# Patient Record
Sex: FEMALE | Race: BLACK OR AFRICAN AMERICAN | NOT HISPANIC OR LATINO | URBAN - METROPOLITAN AREA
[De-identification: names, ages, dates, MRNs, and addresses within clinical notes are randomized per-mention and may not be internally consistent; named-entity substitution may affect disease eponyms.]

---

## 2020-09-04 ENCOUNTER — OFFICE (OUTPATIENT)
Dept: URBAN - METROPOLITAN AREA CLINIC 19 | Facility: CLINIC | Age: 60
End: 2020-09-04
Payer: MEDICAID

## 2020-09-04 VITALS
SYSTOLIC BLOOD PRESSURE: 137 MMHG | HEIGHT: 64 IN | DIASTOLIC BLOOD PRESSURE: 67 MMHG | OXYGEN SATURATION: 99 % | WEIGHT: 293 LBS | HEART RATE: 58 BPM

## 2020-09-04 DIAGNOSIS — D69.59 OTHER SECONDARY THROMBOCYTOPENIA: ICD-10-CM

## 2020-09-04 DIAGNOSIS — R10.9 UNSPECIFIED ABDOMINAL PAIN: ICD-10-CM

## 2020-09-04 DIAGNOSIS — R11.0 NAUSEA: ICD-10-CM

## 2020-09-04 LAB
AMYLASE: 50 U/L (ref 31–110)
CBC, PLATELET, NO DIFFERENTIAL: HEMATOCRIT: 35.7 % (ref 34–46.6)
CBC, PLATELET, NO DIFFERENTIAL: HEMOGLOBIN: 12.2 G/DL (ref 11.1–15.9)
CBC, PLATELET, NO DIFFERENTIAL: MCH: 32.2 PG (ref 26.6–33)
CBC, PLATELET, NO DIFFERENTIAL: MCHC: 34.2 G/DL (ref 31.5–35.7)
CBC, PLATELET, NO DIFFERENTIAL: MCV: 94 FL (ref 79–97)
CBC, PLATELET, NO DIFFERENTIAL: NRBC: (no result)
CBC, PLATELET, NO DIFFERENTIAL: PLATELETS: 99 X10E3/UL — CRITICAL LOW (ref 150–450)
CBC, PLATELET, NO DIFFERENTIAL: RBC: 3.79 X10E6/UL (ref 3.77–5.28)
CBC, PLATELET, NO DIFFERENTIAL: RDW: 11.8 % (ref 11.7–15.4)
CBC, PLATELET, NO DIFFERENTIAL: WBC: 5.1 X10E3/UL (ref 3.4–10.8)
COMP. METABOLIC PANEL (14): A/G RATIO: 1.7 (ref 1.2–2.2)
COMP. METABOLIC PANEL (14): ALBUMIN: 4.2 G/DL (ref 3.8–4.9)
COMP. METABOLIC PANEL (14): ALKALINE PHOSPHATASE: 170 IU/L — HIGH (ref 39–117)
COMP. METABOLIC PANEL (14): ALT (SGPT): 21 IU/L (ref 0–32)
COMP. METABOLIC PANEL (14): AST (SGOT): 23 IU/L (ref 0–40)
COMP. METABOLIC PANEL (14): BILIRUBIN, TOTAL: 0.7 MG/DL (ref 0–1.2)
COMP. METABOLIC PANEL (14): BUN/CREATININE RATIO: 15 (ref 12–28)
COMP. METABOLIC PANEL (14): BUN: 11 MG/DL (ref 8–27)
COMP. METABOLIC PANEL (14): CALCIUM: 9.5 MG/DL (ref 8.7–10.3)
COMP. METABOLIC PANEL (14): CARBON DIOXIDE, TOTAL: 25 MMOL/L (ref 20–29)
COMP. METABOLIC PANEL (14): CHLORIDE: 106 MMOL/L (ref 96–106)
COMP. METABOLIC PANEL (14): CREATININE: 0.75 MG/DL (ref 0.57–1)
COMP. METABOLIC PANEL (14): EGFR IF AFRICN AM: 100 ML/MIN/1.73 (ref 59–?)
COMP. METABOLIC PANEL (14): EGFR IF NONAFRICN AM: 87 ML/MIN/1.73 (ref 59–?)
COMP. METABOLIC PANEL (14): GLOBULIN, TOTAL: 2.5 G/DL (ref 1.5–4.5)
COMP. METABOLIC PANEL (14): GLUCOSE: 105 MG/DL — HIGH (ref 65–99)
COMP. METABOLIC PANEL (14): POTASSIUM: 4.4 MMOL/L (ref 3.5–5.2)
COMP. METABOLIC PANEL (14): PROTEIN, TOTAL: 6.7 G/DL (ref 6–8.5)
COMP. METABOLIC PANEL (14): SODIUM: 144 MMOL/L (ref 134–144)
HEMATOLOGY COMMENTS: (no result)
LIPASE: 17 U/L (ref 14–72)
SEDIMENTATION RATE-WESTERGREN: 89 MM/HR — HIGH (ref 0–40)

## 2020-09-04 PROCEDURE — 99204 OFFICE O/P NEW MOD 45 MIN: CPT | Performed by: INTERNAL MEDICINE

## 2020-09-04 RX ORDER — DEXLANSOPRAZOLE 60 MG/1
60 CAPSULE, DELAYED RELEASE ORAL
Qty: 30 | Refills: 3 | Status: ACTIVE

## 2020-09-04 RX ORDER — ONDANSETRON HYDROCHLORIDE 4 MG/1
TABLET, FILM COATED ORAL
Qty: 35 | Refills: 2 | Status: ACTIVE

## 2020-09-29 ENCOUNTER — AMBULATORY SURGICAL CENTER (OUTPATIENT)
Dept: URBAN - METROPOLITAN AREA SURGERY 3 | Facility: SURGERY | Age: 60
End: 2020-09-29
Payer: MEDICAID

## 2020-09-29 VITALS
SYSTOLIC BLOOD PRESSURE: 130 MMHG | HEART RATE: 60 BPM | DIASTOLIC BLOOD PRESSURE: 68 MMHG | OXYGEN SATURATION: 97 % | HEART RATE: 60 BPM | HEART RATE: 57 BPM | RESPIRATION RATE: 15 BRPM | TEMPERATURE: 97 F | RESPIRATION RATE: 22 BRPM | OXYGEN SATURATION: 95 % | SYSTOLIC BLOOD PRESSURE: 139 MMHG | HEIGHT: 64 IN | SYSTOLIC BLOOD PRESSURE: 129 MMHG | HEIGHT: 64 IN | TEMPERATURE: 97.3 F | SYSTOLIC BLOOD PRESSURE: 138 MMHG | HEART RATE: 59 BPM | DIASTOLIC BLOOD PRESSURE: 76 MMHG | DIASTOLIC BLOOD PRESSURE: 78 MMHG | SYSTOLIC BLOOD PRESSURE: 130 MMHG | HEART RATE: 57 BPM | DIASTOLIC BLOOD PRESSURE: 77 MMHG | HEART RATE: 64 BPM | HEART RATE: 57 BPM | RESPIRATION RATE: 22 BRPM | TEMPERATURE: 97.3 F | HEART RATE: 59 BPM | SYSTOLIC BLOOD PRESSURE: 139 MMHG | OXYGEN SATURATION: 95 % | SYSTOLIC BLOOD PRESSURE: 130 MMHG | TEMPERATURE: 97 F | HEART RATE: 64 BPM | OXYGEN SATURATION: 96 % | RESPIRATION RATE: 23 BRPM | OXYGEN SATURATION: 95 % | RESPIRATION RATE: 16 BRPM | SYSTOLIC BLOOD PRESSURE: 138 MMHG | OXYGEN SATURATION: 97 % | TEMPERATURE: 97.3 F | RESPIRATION RATE: 15 BRPM | SYSTOLIC BLOOD PRESSURE: 128 MMHG | RESPIRATION RATE: 16 BRPM | RESPIRATION RATE: 15 BRPM | HEART RATE: 59 BPM | DIASTOLIC BLOOD PRESSURE: 77 MMHG | RESPIRATION RATE: 16 BRPM | SYSTOLIC BLOOD PRESSURE: 129 MMHG | RESPIRATION RATE: 23 BRPM | HEART RATE: 64 BPM | DIASTOLIC BLOOD PRESSURE: 78 MMHG | SYSTOLIC BLOOD PRESSURE: 128 MMHG | DIASTOLIC BLOOD PRESSURE: 68 MMHG | HEIGHT: 64 IN | DIASTOLIC BLOOD PRESSURE: 71 MMHG | HEART RATE: 60 BPM | SYSTOLIC BLOOD PRESSURE: 139 MMHG | DIASTOLIC BLOOD PRESSURE: 68 MMHG | SYSTOLIC BLOOD PRESSURE: 128 MMHG | DIASTOLIC BLOOD PRESSURE: 78 MMHG | OXYGEN SATURATION: 96 % | DIASTOLIC BLOOD PRESSURE: 71 MMHG | DIASTOLIC BLOOD PRESSURE: 77 MMHG | RESPIRATION RATE: 23 BRPM | DIASTOLIC BLOOD PRESSURE: 76 MMHG | DIASTOLIC BLOOD PRESSURE: 71 MMHG | DIASTOLIC BLOOD PRESSURE: 76 MMHG | SYSTOLIC BLOOD PRESSURE: 129 MMHG | OXYGEN SATURATION: 96 % | TEMPERATURE: 97 F | OXYGEN SATURATION: 97 % | SYSTOLIC BLOOD PRESSURE: 138 MMHG | RESPIRATION RATE: 22 BRPM

## 2020-09-29 DIAGNOSIS — Z98.84 BARIATRIC SURGERY STATUS: ICD-10-CM

## 2020-09-29 DIAGNOSIS — R11.0 NAUSEA: ICD-10-CM

## 2020-09-29 PROBLEM — Z48.815 ENCNTR FOR SURGICAL AFTCR FOLLOWING SURGERY ON THE DGSTV SYS: Status: ACTIVE | Noted: 2020-09-29

## 2020-09-29 PROCEDURE — 43235 EGD DIAGNOSTIC BRUSH WASH: CPT | Performed by: INTERNAL MEDICINE

## 2020-09-29 PROCEDURE — G8907 PT DOC NO EVENTS ON DISCHARG: HCPCS | Performed by: INTERNAL MEDICINE

## 2020-09-29 PROCEDURE — G8918 PT W/O PREOP ORDER IV AB PRO: HCPCS | Performed by: INTERNAL MEDICINE

## 2022-07-07 ENCOUNTER — OFFICE (OUTPATIENT)
Dept: URBAN - METROPOLITAN AREA CLINIC 19 | Facility: CLINIC | Age: 62
End: 2022-07-07
Payer: MEDICAID

## 2022-07-07 VITALS
DIASTOLIC BLOOD PRESSURE: 57 MMHG | WEIGHT: 293 LBS | OXYGEN SATURATION: 100 % | HEART RATE: 65 BPM | HEIGHT: 64 IN | SYSTOLIC BLOOD PRESSURE: 143 MMHG

## 2022-07-07 DIAGNOSIS — K59.00 CONSTIPATION, UNSPECIFIED: ICD-10-CM

## 2022-07-07 DIAGNOSIS — K21.9 GASTRO-ESOPHAGEAL REFLUX DISEASE WITHOUT ESOPHAGITIS: ICD-10-CM

## 2022-07-07 DIAGNOSIS — R74.8 ABNORMAL LEVELS OF OTHER SERUM ENZYMES: ICD-10-CM

## 2022-07-07 LAB
ALK PHOS ISOENZYME: ALKALINE PHOSPHATASE: 192 IU/L — HIGH (ref 44–121)
ALK PHOS ISOENZYME: BONE FRACTION: 45 % (ref 14–68)
ALK PHOS ISOENZYME: INTESTINAL FRAC.: 1 % (ref 0–18)
ALK PHOS ISOENZYME: LIVER FRACTION: 54 % (ref 18–85)

## 2022-07-07 PROCEDURE — 99214 OFFICE O/P EST MOD 30 MIN: CPT

## 2022-07-07 NOTE — SERVICEHPINOTES
62-year-old obese single black female returns as requested by her Rheumatologist, Pasquale Pandey MD for elevated alkaline phosphatase.
abhilash hung 
  She was last here for EGD 9/29/20 to evaluate complaints of vague abdominal pain and nausea.  EGD was normal.  Routine lab work 9/4/20 with Dr. Mujica was remarkable for elevated alkaline phosphatase of 170, ESR of 89 and low platelet count of 99.
abhilash Connell has been seen by hematologist, Mark Muniz MD for her low platelet count.  Blood work 11/10/21 was remarkable for mildly elevated ALT of 38, AST 50, alkaline phosphatase 176, elevated ESR of 43 and positive INDIA 1:160 (speckled).  She was referred to rheumatologist, Pasquale Pandey MD where apparently workup for rheumatoid arthritis and other autoimmune conditions was unremarkable.  She apparently had abnormal liver function tests (? ) and was told to follow-up with GI regarding these (records requested).  She does continue to have chronic vague abdominal pain.  She did have a CT abdomen/pelvis 9/16/21 that was unremarkable.  She does take Dexilant daily for chronic reflux.  She has Zofran for occasional nausea.  She does have chronic constipation, but denies taking any laxatives except for occasional stool softener.   She denies dysphagia or overt GI bleeding.She has seen José Luis Griffith MD in the past. CT abdomen/pelvis 09/25/2012, 10/9/12, MR I abdomen 3/28/12 and 4/2/13 and EGD 9/19/12 were unremarkable except for a 2.5 cm liver hemangioma and H pylori negative gastritis. Raad Contreras MD Performed EGD/colonoscopy 9/23/14 which found H pylori negative gastritis (colonoscopy normal). Family history is negative for colon neoplasm.

## 2022-09-06 ENCOUNTER — OFFICE (OUTPATIENT)
Dept: URBAN - METROPOLITAN AREA PATHOLOGY 22 | Facility: PATHOLOGY | Age: 62
End: 2022-09-06
Payer: MEDICAID

## 2022-09-06 DIAGNOSIS — K74.3 PRIMARY BILIARY CIRRHOSIS: ICD-10-CM

## 2022-09-30 ENCOUNTER — OFFICE (OUTPATIENT)
Dept: URBAN - METROPOLITAN AREA CLINIC 19 | Facility: CLINIC | Age: 62
End: 2022-09-30
Payer: MEDICAID

## 2022-09-30 VITALS
SYSTOLIC BLOOD PRESSURE: 114 MMHG | WEIGHT: 293 LBS | HEART RATE: 64 BPM | HEIGHT: 64 IN | DIASTOLIC BLOOD PRESSURE: 47 MMHG | OXYGEN SATURATION: 100 %

## 2022-09-30 DIAGNOSIS — K74.3 PRIMARY BILIARY CIRRHOSIS: ICD-10-CM

## 2022-09-30 DIAGNOSIS — R10.9 UNSPECIFIED ABDOMINAL PAIN: ICD-10-CM

## 2022-09-30 DIAGNOSIS — K59.00 CONSTIPATION, UNSPECIFIED: ICD-10-CM

## 2022-09-30 DIAGNOSIS — K21.9 GASTRO-ESOPHAGEAL REFLUX DISEASE WITHOUT ESOPHAGITIS: ICD-10-CM

## 2022-09-30 LAB
AFP, SERUM, TUMOR MARKER: 6.8 NG/ML (ref 0–9.2)
CBC, PLATELET, NO DIFFERENTIAL: HEMATOCRIT: 38.3 % (ref 34–46.6)
CBC, PLATELET, NO DIFFERENTIAL: HEMOGLOBIN: 12.7 G/DL (ref 11.1–15.9)
CBC, PLATELET, NO DIFFERENTIAL: MCH: 31.5 PG (ref 26.6–33)
CBC, PLATELET, NO DIFFERENTIAL: MCHC: 33.2 G/DL (ref 31.5–35.7)
CBC, PLATELET, NO DIFFERENTIAL: MCV: 95 FL (ref 79–97)
CBC, PLATELET, NO DIFFERENTIAL: PLATELETS: 97 X10E3/UL — CRITICAL LOW (ref 150–450)
CBC, PLATELET, NO DIFFERENTIAL: RBC: 4.03 X10E6/UL (ref 3.77–5.28)
CBC, PLATELET, NO DIFFERENTIAL: RDW: 12.3 % (ref 11.7–15.4)
CBC, PLATELET, NO DIFFERENTIAL: WBC: 5 X10E3/UL (ref 3.4–10.8)
COMP. METABOLIC PANEL (14): A/G RATIO: 2 (ref 1.2–2.2)
COMP. METABOLIC PANEL (14): ALBUMIN: 4.3 G/DL (ref 3.8–4.8)
COMP. METABOLIC PANEL (14): ALKALINE PHOSPHATASE: 192 IU/L — HIGH (ref 44–121)
COMP. METABOLIC PANEL (14): ALT (SGPT): 17 IU/L (ref 0–32)
COMP. METABOLIC PANEL (14): AST (SGOT): 27 IU/L (ref 0–40)
COMP. METABOLIC PANEL (14): BILIRUBIN, TOTAL: 0.9 MG/DL (ref 0–1.2)
COMP. METABOLIC PANEL (14): BUN/CREATININE RATIO: 18 (ref 12–28)
COMP. METABOLIC PANEL (14): BUN: 12 MG/DL (ref 8–27)
COMP. METABOLIC PANEL (14): CALCIUM: 9 MG/DL (ref 8.7–10.3)
COMP. METABOLIC PANEL (14): CARBON DIOXIDE, TOTAL: 26 MMOL/L (ref 20–29)
COMP. METABOLIC PANEL (14): CHLORIDE: 106 MMOL/L (ref 96–106)
COMP. METABOLIC PANEL (14): CREATININE: 0.66 MG/DL (ref 0.57–1)
COMP. METABOLIC PANEL (14): EGFR: 99 ML/MIN/1.73 (ref 59–?)
COMP. METABOLIC PANEL (14): GLOBULIN, TOTAL: 2.1 G/DL (ref 1.5–4.5)
COMP. METABOLIC PANEL (14): GLUCOSE: 88 MG/DL (ref 70–99)
COMP. METABOLIC PANEL (14): POTASSIUM: 4.2 MMOL/L (ref 3.5–5.2)
COMP. METABOLIC PANEL (14): PROTEIN, TOTAL: 6.4 G/DL (ref 6–8.5)
COMP. METABOLIC PANEL (14): SODIUM: 144 MMOL/L (ref 134–144)
HEMATOLOGY COMMENTS: (no result)
PROTHROMBIN TIME (PT): INR: 1 (ref 0.9–1.2)
PROTHROMBIN TIME (PT): PROTHROMBIN TIME: 10.5 SEC (ref 9.1–12)

## 2022-09-30 PROCEDURE — 99214 OFFICE O/P EST MOD 30 MIN: CPT

## 2022-09-30 RX ORDER — URSODIOL 500 MG/1
2000 TABLET ORAL
Qty: 120 | Refills: 5 | Status: ACTIVE
Start: 2022-09-30

## 2022-09-30 NOTE — SERVICENOTES
The patient's assessment was reviewed with Dr. Mujica and a collaborative plan of care was established.

## 2022-09-30 NOTE — SERVICEHPINOTES
62-year-old obese single black female returns to discuss her liver biopsy results.
abhilash Kaye saw her last all 7/7/22 as requested by her Rheumatologist, Pasquale Pandey MD for elevated alkaline phosphatase. She had been seen by hematologist, Mark Muniz MD for her low platelet count.  Blood work 11/10/21 was remarkable for mildly elevated ALT 38, AST 50, alkaline phosphatase 176, elevated ESR of 43 and positive INDIA 1:160 (speckled).  She was referred to rheumatologist, Pasquale Pandey MD where apparently workup for rheumatoid arthritis and other autoimmune conditions was unremarkable.  She apparently had abnormal liver function tests (? 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 She does continue to have chronic vague abdominal pain.  She did have a CT abdomen/pelvis 9/16/21 that was unremarkable.   I ordered alkaline phosphatase isoenzymes 7/7/22 which found elevated ALP of 192, but was otherwise unremarkable.  She had a liver biopsy 9/6/22 which confirmed primary biliary cholangitis stage I.
abhilash Connell does take Dexilant daily for chronic reflux.  She has Zofran for occasional nausea.  She does have chronic constipation, but denies taking any laxatives except for occasional stool softener.   She denies dysphagia or overt GI bleeding.  She was here for EGD 9/29/20 to evaluate complaints of vague abdominal pain and nausea.  EGD was normal.  Routine lab work 9/4/20 with Dr. Mujica was remarkable for elevated alkaline phosphatase of 170, ESR of 89 and low platelet count of 99.She has seen José Luis Griffith MD in the past. CT abdomen/pelvis 09/25/2012, 10/9/12, MR I abdomen 3/28/12 and 4/2/13 and EGD 9/19/12 were unremarkable except for a 2.5 cm liver hemangioma and H pylori negative gastritis. Raad Contreras MD Performed EGD/colonoscopy 9/23/14 which found H pylori negative gastritis (colonoscopy normal). Family history is negative for colon neoplasm.

## 2023-03-30 ENCOUNTER — OFFICE (OUTPATIENT)
Dept: URBAN - METROPOLITAN AREA CLINIC 19 | Facility: CLINIC | Age: 63
End: 2023-03-30
Payer: MEDICAID

## 2023-03-30 VITALS
WEIGHT: 293 LBS | HEART RATE: 74 BPM | HEIGHT: 63 IN | SYSTOLIC BLOOD PRESSURE: 133 MMHG | OXYGEN SATURATION: 96 % | DIASTOLIC BLOOD PRESSURE: 60 MMHG

## 2023-03-30 DIAGNOSIS — R53.83 OTHER FATIGUE: ICD-10-CM

## 2023-03-30 DIAGNOSIS — K59.00 CONSTIPATION, UNSPECIFIED: ICD-10-CM

## 2023-03-30 DIAGNOSIS — K21.9 GASTRO-ESOPHAGEAL REFLUX DISEASE WITHOUT ESOPHAGITIS: ICD-10-CM

## 2023-03-30 DIAGNOSIS — K74.3 PRIMARY BILIARY CIRRHOSIS: ICD-10-CM

## 2023-03-30 PROCEDURE — 99214 OFFICE O/P EST MOD 30 MIN: CPT

## 2023-03-30 NOTE — SERVICEHPINOTES
62-year-old obese single black female returns for routine follow up of her PBC.  She denies any significant interval history since I saw her 9/30/22 discuss her liver biopsy results and initiate treatment for her primary biliary cholangitis.  She does have fatigue that has been persistent, but might be related to cardiac as she is being seen by a cardiologist for this.  She does have chronic reflux which seems to be relatively well managed on omeprazole 40 mg daily. She was previously on Dexilant, but this was switched to omeprazole due to insurance.  She also has chronic constipation and takes a daily stool softener, but often only has a bowel movement once or twice a week.  She denies nausea, vomiting, dysphagia, abdominal pain or overt GI bleeding.
br
br   Routine lab work 9/3/22 was remarkable for OGD=511.I saw her 7/7/22 as requested by her Rheumatologist, Pasquale Pandey MD for elevated alkaline phosphatase. She had been seen by hematologist, Mark Muniz MD for her low platelet count.  Blood work 11/10/21 was remarkable for mildly elevated ALT 38, AST 50, alkaline phosphatase 176, elevated ESR of 43 and positive INDIA 1:160 (speckled).  She was referred to rheumatologist, Pasquale Pandey MD where apparently workup for rheumatoid arthritis and other autoimmune conditions was unremarkable.  She apparently had abnormal liver function tests (? 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 She does continue to have chronic vague abdominal pain.  She did have a CT abdomen/pelvis 9/16/21 that was unremarkable.   I ordered alkaline phosphatase isoenzymes 7/7/22 which found elevated ALP of 192, but was otherwise unremarkable.  She had a liver biopsy 9/6/22 which confirmed primary biliary cholangitis stage I.  She was here for EGD 9/29/20 to evaluate complaints of vague abdominal pain and nausea.  EGD was normal.  Routine lab work 9/4/20 with Dr. Mujica was remarkable for elevated alkaline phosphatase of 170, ESR of 89 and low platelet count of 99.She has seen José Luis Griffith MD in the past. CT abdomen/pelvis 09/25/2012, 10/9/12, MR I abdomen 3/28/12 and 4/2/13 and EGD 9/19/12 were unremarkable except for a 2.5 cm liver hemangioma and H pylori negative gastritis. Raad Contreras MD Performed EGD/colonoscopy 9/23/14 which found H pylori negative gastritis (colonoscopy normal). Family history is negative for colon neoplasm.

## 2023-03-31 LAB
AFP, SERUM, TUMOR MARKER: 8.4 NG/ML (ref 0–9.2)
CBC, PLATELET, NO DIFFERENTIAL: HEMATOCRIT: 36.1 % (ref 34–46.6)
CBC, PLATELET, NO DIFFERENTIAL: HEMATOLOGY COMMENTS: (no result)
CBC, PLATELET, NO DIFFERENTIAL: HEMOGLOBIN: 12 G/DL (ref 11.1–15.9)
CBC, PLATELET, NO DIFFERENTIAL: MCH: 31 PG (ref 26.6–33)
CBC, PLATELET, NO DIFFERENTIAL: MCHC: 33.2 G/DL (ref 31.5–35.7)
CBC, PLATELET, NO DIFFERENTIAL: MCV: 93 FL (ref 79–97)
CBC, PLATELET, NO DIFFERENTIAL: NRBC: (no result)
CBC, PLATELET, NO DIFFERENTIAL: PLATELETS: 99 X10E3/UL — CRITICAL LOW (ref 150–450)
CBC, PLATELET, NO DIFFERENTIAL: RBC: 3.87 X10E6/UL (ref 3.77–5.28)
CBC, PLATELET, NO DIFFERENTIAL: RDW: 12 % (ref 11.7–15.4)
CBC, PLATELET, NO DIFFERENTIAL: WBC: 4.4 X10E3/UL (ref 3.4–10.8)
COMP. METABOLIC PANEL (14): A/G RATIO: 1.6 (ref 1.2–2.2)
COMP. METABOLIC PANEL (14): ALBUMIN: 4 G/DL (ref 3.8–4.8)
COMP. METABOLIC PANEL (14): ALKALINE PHOSPHATASE: 206 IU/L — HIGH (ref 44–121)
COMP. METABOLIC PANEL (14): ALT (SGPT): 9 IU/L (ref 0–32)
COMP. METABOLIC PANEL (14): AST (SGOT): 17 IU/L (ref 0–40)
COMP. METABOLIC PANEL (14): BILIRUBIN, TOTAL: 0.7 MG/DL (ref 0–1.2)
COMP. METABOLIC PANEL (14): BUN/CREATININE RATIO: 22 (ref 12–28)
COMP. METABOLIC PANEL (14): BUN: 16 MG/DL (ref 8–27)
COMP. METABOLIC PANEL (14): CALCIUM: 9.1 MG/DL (ref 8.7–10.3)
COMP. METABOLIC PANEL (14): CARBON DIOXIDE, TOTAL: 23 MMOL/L (ref 20–29)
COMP. METABOLIC PANEL (14): CHLORIDE: 107 MMOL/L — HIGH (ref 96–106)
COMP. METABOLIC PANEL (14): CREATININE: 0.73 MG/DL (ref 0.57–1)
COMP. METABOLIC PANEL (14): EGFR: 93 ML/MIN/1.73 (ref 59–?)
COMP. METABOLIC PANEL (14): GLOBULIN, TOTAL: 2.5 G/DL (ref 1.5–4.5)
COMP. METABOLIC PANEL (14): GLUCOSE: 100 MG/DL — HIGH (ref 70–99)
COMP. METABOLIC PANEL (14): POTASSIUM: 4.4 MMOL/L (ref 3.5–5.2)
COMP. METABOLIC PANEL (14): PROTEIN, TOTAL: 6.5 G/DL (ref 6–8.5)
COMP. METABOLIC PANEL (14): SODIUM: 144 MMOL/L (ref 134–144)
PROTHROMBIN TIME (PT): INR: 1 (ref 0.9–1.2)
PROTHROMBIN TIME (PT): PROTHROMBIN TIME: 10.4 SEC (ref 9.1–12)

## 2023-04-25 ENCOUNTER — OFFICE (OUTPATIENT)
Dept: URBAN - METROPOLITAN AREA CLINIC 19 | Facility: CLINIC | Age: 63
End: 2023-04-25
Payer: MEDICAID

## 2023-04-25 DIAGNOSIS — K74.3 PRIMARY BILIARY CIRRHOSIS: ICD-10-CM

## 2023-04-25 PROCEDURE — 76700 US EXAM ABDOM COMPLETE: CPT | Mod: TC

## 2023-06-07 ENCOUNTER — OFFICE (OUTPATIENT)
Dept: URBAN - METROPOLITAN AREA CLINIC 19 | Facility: CLINIC | Age: 63
End: 2023-06-07
Payer: MEDICAID

## 2023-06-07 VITALS
HEART RATE: 53 BPM | HEIGHT: 63 IN | DIASTOLIC BLOOD PRESSURE: 51 MMHG | WEIGHT: 293 LBS | SYSTOLIC BLOOD PRESSURE: 134 MMHG | OXYGEN SATURATION: 99 %

## 2023-06-07 DIAGNOSIS — K21.9 GASTRO-ESOPHAGEAL REFLUX DISEASE WITHOUT ESOPHAGITIS: ICD-10-CM

## 2023-06-07 DIAGNOSIS — K74.3 PRIMARY BILIARY CIRRHOSIS: ICD-10-CM

## 2023-06-07 DIAGNOSIS — K59.00 CONSTIPATION, UNSPECIFIED: ICD-10-CM

## 2023-06-07 DIAGNOSIS — R53.83 OTHER FATIGUE: ICD-10-CM

## 2023-06-07 PROCEDURE — 99214 OFFICE O/P EST MOD 30 MIN: CPT

## 2023-06-07 NOTE — SERVICEHPINOTES
62-year-old obese single black female returns for follow up of her PBC. I last saw her 3/30/23 where routine lab work found EXU=353.  ALP was 192 prior to this on 9/3/22.  I wanted to start her on 5 mg of Ocaliva, but of had great difficulty and getting coverage for this.  Finally this week we received approval from her insurance of coverage.  She has not received the medication yet, but should by the end of the week.  She has been seeing her PCP, Allan Pang DO for fluid overload and peripheral edema.  He has her on Lasix 20 mg daily and apparently sent in another medication that was contraindicated by the pharmacy.  This was just sent on Friday of last week and she has not been able to contact her PCP about this.  She also follows with hematologist, Mark Muniz MD for thrombocytopenia.  He believes this is a result of her primary biliary cholangitis.  He also is wondering if the fluid overload is due to her underlying PBC.  She had an AUS 4/25/23 which found mild hepatosplenomegaly consistent with primary biliary cholangitis and chronic liver disease.  There is no ascites seen.  Her weight is somewhat stable since I saw her last 3/30/23 as she was 3 of 5 lb at that time and is 309 lb today.  She has chronic constipation and has been advised to take MiraLax regularly for this.  She denies any overt GI bleeding.  She is still on Ursodiol 1 g BID.I saw her 7/7/22 as requested by her Rheumatologist, Pasquale Pandey MD for elevated alkaline phosphatase. She had been seen by hematologist, Mark Muniz MD for her low platelet count.  Blood work 11/10/21 was remarkable for mildly elevated ALT 38, AST 50, alkaline phosphatase 176, elevated ESR of 43 and positive INDIA 1:160 (speckled).  She was referred to rheumatologist, Pasquale Pandey MD where apparently workup for rheumatoid arthritis and other autoimmune conditions was unremarkable.  She apparently had abnormal liver function tests (? 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 She does continue to have chronic vague abdominal pain.  She did have a CT abdomen/pelvis 9/16/21 that was unremarkable.   I ordered alkaline phosphatase isoenzymes 7/7/22 which found elevated ALP of 192, but was otherwise unremarkable.  She had a liver biopsy 9/6/22 which confirmed primary biliary cholangitis stage I.  She was here for EGD 9/29/20 to evaluate complaints of vague abdominal pain and nausea.  EGD was normal.  Routine lab work 9/4/20 with Dr. Mujica was remarkable for elevated alkaline phosphatase of 170, ESR of 89 and low platelet count of 99.She has seen José Luis Griffith MD in the past. CT abdomen/pelvis 09/25/2012, 10/9/12, MR I abdomen 3/28/12 and 4/2/13 and EGD 9/19/12 were unremarkable except for a 2.5 cm liver hemangioma and H pylori negative gastritis. Raad Contreras MD Performed EGD/colonoscopy 9/23/14 which found H pylori negative gastritis (colonoscopy normal). Family history is negative for colon neoplasm.

## 2023-06-08 LAB
CBC, PLATELET, NO DIFFERENTIAL: HEMATOCRIT: 35.4 % (ref 34–46.6)
CBC, PLATELET, NO DIFFERENTIAL: HEMATOLOGY COMMENTS: (no result)
CBC, PLATELET, NO DIFFERENTIAL: HEMOGLOBIN: 11.6 G/DL (ref 11.1–15.9)
CBC, PLATELET, NO DIFFERENTIAL: MCH: 31.1 PG (ref 26.6–33)
CBC, PLATELET, NO DIFFERENTIAL: MCHC: 32.8 G/DL (ref 31.5–35.7)
CBC, PLATELET, NO DIFFERENTIAL: MCV: 95 FL (ref 79–97)
CBC, PLATELET, NO DIFFERENTIAL: NRBC: (no result)
CBC, PLATELET, NO DIFFERENTIAL: PLATELETS: 92 X10E3/UL — CRITICAL LOW (ref 150–450)
CBC, PLATELET, NO DIFFERENTIAL: RBC: 3.73 X10E6/UL — LOW (ref 3.77–5.28)
CBC, PLATELET, NO DIFFERENTIAL: RDW: 12 % (ref 11.7–15.4)
CBC, PLATELET, NO DIFFERENTIAL: WBC: 5.3 X10E3/UL (ref 3.4–10.8)
COMP. METABOLIC PANEL (14): A/G RATIO: 1.5 (ref 1.2–2.2)
COMP. METABOLIC PANEL (14): ALBUMIN: 4 G/DL (ref 3.8–4.8)
COMP. METABOLIC PANEL (14): ALKALINE PHOSPHATASE: 219 IU/L — HIGH (ref 44–121)
COMP. METABOLIC PANEL (14): ALT (SGPT): 10 IU/L (ref 0–32)
COMP. METABOLIC PANEL (14): AST (SGOT): 19 IU/L (ref 0–40)
COMP. METABOLIC PANEL (14): BILIRUBIN, TOTAL: 0.8 MG/DL (ref 0–1.2)
COMP. METABOLIC PANEL (14): BUN/CREATININE RATIO: 21 (ref 12–28)
COMP. METABOLIC PANEL (14): BUN: 15 MG/DL (ref 8–27)
COMP. METABOLIC PANEL (14): CALCIUM: 9.2 MG/DL (ref 8.7–10.3)
COMP. METABOLIC PANEL (14): CARBON DIOXIDE, TOTAL: 26 MMOL/L (ref 20–29)
COMP. METABOLIC PANEL (14): CHLORIDE: 103 MMOL/L (ref 96–106)
COMP. METABOLIC PANEL (14): CREATININE: 0.73 MG/DL (ref 0.57–1)
COMP. METABOLIC PANEL (14): EGFR: 93 ML/MIN/1.73 (ref 59–?)
COMP. METABOLIC PANEL (14): GLOBULIN, TOTAL: 2.7 G/DL (ref 1.5–4.5)
COMP. METABOLIC PANEL (14): GLUCOSE: 84 MG/DL (ref 70–99)
COMP. METABOLIC PANEL (14): POTASSIUM: 4.6 MMOL/L (ref 3.5–5.2)
COMP. METABOLIC PANEL (14): PROTEIN, TOTAL: 6.7 G/DL (ref 6–8.5)
COMP. METABOLIC PANEL (14): SODIUM: 144 MMOL/L (ref 134–144)
PROTHROMBIN TIME (PT): INR: 1 (ref 0.9–1.2)
PROTHROMBIN TIME (PT): PROTHROMBIN TIME: 10.5 SEC (ref 9.1–12)

## 2023-09-18 ENCOUNTER — OFFICE (OUTPATIENT)
Dept: URBAN - METROPOLITAN AREA CLINIC 19 | Facility: CLINIC | Age: 63
End: 2023-09-18
Payer: MEDICAID

## 2023-09-18 VITALS
WEIGHT: 293 LBS | SYSTOLIC BLOOD PRESSURE: 155 MMHG | DIASTOLIC BLOOD PRESSURE: 49 MMHG | HEIGHT: 63 IN | HEART RATE: 58 BPM | OXYGEN SATURATION: 98 %

## 2023-09-18 DIAGNOSIS — K74.3 PRIMARY BILIARY CIRRHOSIS: ICD-10-CM

## 2023-09-18 DIAGNOSIS — K21.9 GASTRO-ESOPHAGEAL REFLUX DISEASE WITHOUT ESOPHAGITIS: ICD-10-CM

## 2023-09-18 DIAGNOSIS — K59.00 CONSTIPATION, UNSPECIFIED: ICD-10-CM

## 2023-09-18 DIAGNOSIS — R53.83 OTHER FATIGUE: ICD-10-CM

## 2023-09-18 PROCEDURE — 99214 OFFICE O/P EST MOD 30 MIN: CPT

## 2023-09-18 NOTE — SERVICEHPINOTES
62-year-old obese single black female returns for follow up of her PBC.
br
abhilash She has now been on the Ocaliva 5 mg/d for the last 3 months.  I saw her 3/30/23 where routine lab work found KJK=[id number].  ALP was 192 prior to this 9/3/22.  I wanted to start her on 5 mg of Ocaliva at that, but had great difficulty in getting coverage for this.  Finally we received approval from her insurance of coverage the day I saw her most recently in clinic, 6/7/23.  She had not received the medication at that point, but was expecting it at the end of that week.  She had been seeing her PCP, Allan Pang DO for fluid overload and peripheral edema.  He had her on Lasix 20 mg/d and apparently sent in another medication that was contraindicated by the pharmacy.  She follows with hematologist, Mark Muniz MD for thrombocytopenia.  He believes this is a result of her PBC.  He also is wondering if the fluid overload is due to her underlying PBC.  She had an AUS 4/25/23 which found mild hepatosplenomegaly consistent with primary biliary cholangitis and chronic liver disease.  There was no ascites seen.  
br
abhilash   She returns again today in follow-up after being on the Ocaliva 5 mg/d.  She apparently went to the ER the end of last month for dehydration and had significant kidney dysfunction.  She followed up shortly with her nephrologist, Aden Ortega MD, and was taken off quite a few her medications.  She cannot identify which ones were discontinued, but did not think any of the more GI related.  She is doing better now and follows up with him in a few weeks to re-evaluate her kidney function.  She has chronic constipation and is taking MiraLax every other day for this.  She is also on omeprazole 40 milligrams daily for chronic GERD.  She still takes the Ursodiol 1 g BID in addition to the Ocaliva 5 mg/d.I saw her 7/7/22 as requested by her Rheumatologist, Pasquale Pandey MD for elevated alkaline phosphatase. She had been seen by hematologist, Mark Muniz MD for her low platelet count.  Blood work 11/10/21 was remarkable for mildly elevated ALT 38, AST 50, alkaline phosphatase 176, elevated ESR of 43 and positive INDIA 1:160 (speckled).  She was referred to rheumatologist, Pasquale Pandey MD where apparently workup for rheumatoid arthritis and other autoimmune conditions was unremarkable.  She apparently had abnormal liver function tests (? 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 She does continue to have chronic vague abdominal pain.  She did have a CT abdomen/pelvis 9/16/21 that was unremarkable.   I ordered alkaline phosphatase isoenzymes 7/7/22 which found elevated ALP of 192, but was otherwise unremarkable.  She had a liver biopsy 9/6/22 which confirmed primary biliary cholangitis stage I.  She was here for EGD 9/29/20 to evaluate complaints of vague abdominal pain and nausea.  EGD was normal.  Routine lab work 9/4/20 with Dr. Mujica was remarkable for elevated alkaline phosphatase of 170, ESR of 89 and low platelet count of 99.She has seen José Luis Griffith MD in the past. CT abdomen/pelvis 09/25/2012, 10/9/12, MR I abdomen 3/28/12 and 4/2/13 and EGD 9/19/12 were unremarkable except for a 2.5 cm liver hemangioma and H pylori negative gastritis. Raad Contreras MD Performed EGD/colonoscopy 9/23/14 which found H pylori negative gastritis (colonoscopy normal). Family history is negative for colon neoplasm.

## 2023-09-19 LAB
AFP, SERUM, TUMOR MARKER: 9.8 NG/ML — HIGH (ref 0–9.2)
CBC, PLATELET, NO DIFFERENTIAL: HEMATOCRIT: 35.8 % (ref 34–46.6)
CBC, PLATELET, NO DIFFERENTIAL: HEMATOLOGY COMMENTS: (no result)
CBC, PLATELET, NO DIFFERENTIAL: HEMOGLOBIN: 11.7 G/DL (ref 11.1–15.9)
CBC, PLATELET, NO DIFFERENTIAL: MCH: 31 PG (ref 26.6–33)
CBC, PLATELET, NO DIFFERENTIAL: MCHC: 32.7 G/DL (ref 31.5–35.7)
CBC, PLATELET, NO DIFFERENTIAL: MCV: 95 FL (ref 79–97)
CBC, PLATELET, NO DIFFERENTIAL: PLATELETS: 92 X10E3/UL — CRITICAL LOW (ref 150–450)
CBC, PLATELET, NO DIFFERENTIAL: RBC: 3.77 X10E6/UL (ref 3.77–5.28)
CBC, PLATELET, NO DIFFERENTIAL: RDW: 12.1 % (ref 11.7–15.4)
CBC, PLATELET, NO DIFFERENTIAL: WBC: 3.9 X10E3/UL (ref 3.4–10.8)
COMP. METABOLIC PANEL (14): A/G RATIO: 1.7 (ref 1.2–2.2)
COMP. METABOLIC PANEL (14): ALBUMIN: 4.2 G/DL (ref 3.9–4.9)
COMP. METABOLIC PANEL (14): ALKALINE PHOSPHATASE: 183 IU/L — HIGH (ref 44–121)
COMP. METABOLIC PANEL (14): ALT (SGPT): 7 IU/L (ref 0–32)
COMP. METABOLIC PANEL (14): AST (SGOT): 14 IU/L (ref 0–40)
COMP. METABOLIC PANEL (14): BILIRUBIN, TOTAL: 0.8 MG/DL (ref 0–1.2)
COMP. METABOLIC PANEL (14): BUN/CREATININE RATIO: 23 (ref 12–28)
COMP. METABOLIC PANEL (14): BUN: 28 MG/DL — HIGH (ref 8–27)
COMP. METABOLIC PANEL (14): CALCIUM: 9.2 MG/DL (ref 8.7–10.3)
COMP. METABOLIC PANEL (14): CARBON DIOXIDE, TOTAL: 21 MMOL/L (ref 20–29)
COMP. METABOLIC PANEL (14): CHLORIDE: 109 MMOL/L — HIGH (ref 96–106)
COMP. METABOLIC PANEL (14): CREATININE: 1.21 MG/DL — HIGH (ref 0.57–1)
COMP. METABOLIC PANEL (14): EGFR: 50 ML/MIN/1.73 — LOW (ref 59–?)
COMP. METABOLIC PANEL (14): GLOBULIN, TOTAL: 2.5 G/DL (ref 1.5–4.5)
COMP. METABOLIC PANEL (14): GLUCOSE: 94 MG/DL (ref 70–99)
COMP. METABOLIC PANEL (14): POTASSIUM: 5.4 MMOL/L — HIGH (ref 3.5–5.2)
COMP. METABOLIC PANEL (14): PROTEIN, TOTAL: 6.7 G/DL (ref 6–8.5)
COMP. METABOLIC PANEL (14): SODIUM: 142 MMOL/L (ref 134–144)
PROTHROMBIN TIME (PT): INR: 1 (ref 0.9–1.2)
PROTHROMBIN TIME (PT): PROTHROMBIN TIME: 10.6 SEC (ref 9.1–12)

## 2023-12-30 ENCOUNTER — OFFICE (OUTPATIENT)
Dept: URBAN - METROPOLITAN AREA CLINIC 19 | Facility: CLINIC | Age: 63
End: 2023-12-30
Payer: MEDICAID

## 2023-12-30 DIAGNOSIS — K74.3 PRIMARY BILIARY CIRRHOSIS: ICD-10-CM

## 2023-12-30 DIAGNOSIS — K21.9 GASTRO-ESOPHAGEAL REFLUX DISEASE WITHOUT ESOPHAGITIS: ICD-10-CM

## 2023-12-30 PROCEDURE — 99490 CHRNC CARE MGMT STAFF 1ST 20: CPT | Performed by: INTERNAL MEDICINE

## 2023-12-30 PROCEDURE — 99439 CHRNC CARE MGMT STAF EA ADDL: CPT | Performed by: INTERNAL MEDICINE

## 2024-01-31 ENCOUNTER — OFFICE (OUTPATIENT)
Dept: URBAN - METROPOLITAN AREA CLINIC 19 | Facility: CLINIC | Age: 64
End: 2024-01-31

## 2024-01-31 DIAGNOSIS — K21.9 GASTRO-ESOPHAGEAL REFLUX DISEASE WITHOUT ESOPHAGITIS: ICD-10-CM

## 2024-01-31 DIAGNOSIS — K74.3 PRIMARY BILIARY CIRRHOSIS: ICD-10-CM

## 2024-01-31 PROCEDURE — 99490 CHRNC CARE MGMT STAFF 1ST 20: CPT | Performed by: INTERNAL MEDICINE

## 2024-02-29 ENCOUNTER — OFFICE (OUTPATIENT)
Dept: URBAN - METROPOLITAN AREA CLINIC 19 | Facility: CLINIC | Age: 64
End: 2024-02-29
Payer: MEDICAID

## 2024-02-29 DIAGNOSIS — K74.3 PRIMARY BILIARY CIRRHOSIS: ICD-10-CM

## 2024-02-29 DIAGNOSIS — K21.9 GASTRO-ESOPHAGEAL REFLUX DISEASE WITHOUT ESOPHAGITIS: ICD-10-CM

## 2024-02-29 PROCEDURE — 99439 CHRNC CARE MGMT STAF EA ADDL: CPT | Performed by: INTERNAL MEDICINE

## 2024-02-29 PROCEDURE — 99490 CHRNC CARE MGMT STAFF 1ST 20: CPT | Performed by: INTERNAL MEDICINE

## 2024-04-30 ENCOUNTER — OFFICE (OUTPATIENT)
Dept: URBAN - METROPOLITAN AREA CLINIC 19 | Facility: CLINIC | Age: 64
End: 2024-04-30

## 2024-04-30 DIAGNOSIS — K74.3 PRIMARY BILIARY CIRRHOSIS: ICD-10-CM

## 2024-04-30 DIAGNOSIS — K21.9 GASTRO-ESOPHAGEAL REFLUX DISEASE WITHOUT ESOPHAGITIS: ICD-10-CM

## 2024-04-30 PROCEDURE — 99490 CHRNC CARE MGMT STAFF 1ST 20: CPT | Performed by: INTERNAL MEDICINE

## 2024-04-30 PROCEDURE — 99439 CHRNC CARE MGMT STAF EA ADDL: CPT | Performed by: INTERNAL MEDICINE

## 2024-05-31 ENCOUNTER — OFFICE (OUTPATIENT)
Dept: URBAN - METROPOLITAN AREA CLINIC 19 | Facility: CLINIC | Age: 64
End: 2024-05-31

## 2024-05-31 DIAGNOSIS — K74.3 PRIMARY BILIARY CIRRHOSIS: ICD-10-CM

## 2024-05-31 DIAGNOSIS — K21.9 GASTRO-ESOPHAGEAL REFLUX DISEASE WITHOUT ESOPHAGITIS: ICD-10-CM

## 2024-05-31 PROCEDURE — 99439 CHRNC CARE MGMT STAF EA ADDL: CPT | Performed by: INTERNAL MEDICINE

## 2024-05-31 PROCEDURE — 99490 CHRNC CARE MGMT STAFF 1ST 20: CPT | Performed by: INTERNAL MEDICINE

## 2024-06-27 ENCOUNTER — OFFICE (OUTPATIENT)
Dept: URBAN - METROPOLITAN AREA CLINIC 19 | Facility: CLINIC | Age: 64
End: 2024-06-27
Payer: MEDICAID

## 2024-06-27 VITALS
WEIGHT: 293 LBS | OXYGEN SATURATION: 98 % | HEIGHT: 63 IN | SYSTOLIC BLOOD PRESSURE: 128 MMHG | DIASTOLIC BLOOD PRESSURE: 60 MMHG | HEART RATE: 78 BPM

## 2024-06-27 DIAGNOSIS — K59.00 CONSTIPATION, UNSPECIFIED: ICD-10-CM

## 2024-06-27 DIAGNOSIS — K74.3 PRIMARY BILIARY CIRRHOSIS: ICD-10-CM

## 2024-06-27 DIAGNOSIS — R74.8 ABNORMAL LEVELS OF OTHER SERUM ENZYMES: ICD-10-CM

## 2024-06-27 DIAGNOSIS — K21.9 GASTRO-ESOPHAGEAL REFLUX DISEASE WITHOUT ESOPHAGITIS: ICD-10-CM

## 2024-06-27 PROCEDURE — 99214 OFFICE O/P EST MOD 30 MIN: CPT

## 2024-06-27 RX ORDER — SODIUM PICOSULFATE, MAGNESIUM OXIDE, AND ANHYDROUS CITRIC ACID 12; 3.5; 1 G/175ML; G/175ML; MG/175ML
LIQUID ORAL
Qty: 1 | Refills: 0 | Status: COMPLETED
Start: 2024-06-27 | End: 2024-08-19

## 2024-06-27 NOTE — SERVICENOTES
The patient's assessment was reviewed with Allan Mujica MD and a collaborative plan of care was established.

## 2024-06-27 NOTE — SERVICEHPINOTES
64-year-old obese single black female returns for follow up of her PBC and complaints of severe itching. She has tried creams, Benadryl and Hydroxyzine without relief. She has chronic constipation and is taking MiraLax every other day. Her GERD seems well controlled on Prilosec 40 mg/d. She takes Ursodiol 1 g BID in addition to Ocaliva 5 mg/d for PBC. She denies dysphagia, nausea, abdominal pain, change in bowel habit or any overt GI bleeding. She reports she recently had a CT done at [facility] to look for kidney stones and a mass was found in her RLQ. An MRI was done for further assessment. I have requested both of those reports for review. She is scheduled to see Patrick Bui MD (surgeon) on 7/1/24 to discuss surgical removal of the mass. She was seen 3/30/23 where routine lab found ZTD=793. She was started on Ocaliva 5 mg after a lengthy approval process with her insurance. She follows with hematologist, Mark Muniz MD for thrombocytopenia. She had an AUS 4/25/23 which found mild hepatosplenomegaly consistent with primary biliary cholangitis and chronic liver disease. There was no ascites seen.  She was seen 9/18/23 for follow-up after being on the Ocaliva 5 mg/d.  She went to the ER for dehydration and had significant kidney dysfunction a month prior. She followed up shortly with her nephrologist, Aden Ortega MD, and was taken off quite a few her medications.
abhilash Connell was seen 7/7/22 as requested by her Rheumatologist, Pasquale Pandey MD for elevated alkaline phosphatase. She had been seen by hematologist, Mark Muniz MD for her low platelet count. Blood work 11/10/21 was remarkable for mildly elevated ALT 38, AST 50, alkaline phosphatase 176, elevated ESR of 43 and positive INDIA 1:160 (speckled).  She was referred to rheumatologist, Pasquale Pandey MD where apparently workup for rheumatoid arthritis and other autoimmune conditions was unremarkable.  She apparently had abnormal liver function tests (? 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 She had a CT abdomen/pelvis 9/16/21 that was unremarkable. Alkaline phosphatase isoenzymes 7/7/22 found elevated ALP of 192, but was otherwise unremarkable. She had a liver biopsy 9/6/22 which confirmed primary biliary cholangitis stage I.She was here for EGD 9/29/20 to evaluate complaints of vague abdominal pain and nausea.  EGD was normal.  Routine lab work 9/4/20 was remarkable for elevated alkaline phosphatase of 170, ESR of 89 and low platelet count of 99.She has seen José Luis Grififth MD in the past. CT abdomen/pelvis 09/25/2012, 10/9/12, MR I abdomen 3/28/12 and 4/2/13 and EGD 9/19/12 were unremarkable except for a 2.5 cm liver hemangioma and H pylori negative gastritis. Raad Contreras MD Performed EGD/colonoscopy 9/23/14 which found H pylori negative gastritis (colonoscopy normal).
br
br  Family history is negative for colon neoplasm.

## 2024-06-30 ENCOUNTER — OFFICE (OUTPATIENT)
Dept: URBAN - METROPOLITAN AREA CLINIC 19 | Facility: CLINIC | Age: 64
End: 2024-06-30
Payer: MEDICAID

## 2024-06-30 DIAGNOSIS — K74.3 PRIMARY BILIARY CIRRHOSIS: ICD-10-CM

## 2024-06-30 DIAGNOSIS — K21.9 GASTRO-ESOPHAGEAL REFLUX DISEASE WITHOUT ESOPHAGITIS: ICD-10-CM

## 2024-06-30 PROCEDURE — 99490 CHRNC CARE MGMT STAFF 1ST 20: CPT | Performed by: INTERNAL MEDICINE

## 2024-07-19 ENCOUNTER — OFFICE (OUTPATIENT)
Dept: URBAN - METROPOLITAN AREA CLINIC 11 | Facility: CLINIC | Age: 64
End: 2024-07-19
Payer: MEDICAID

## 2024-07-19 VITALS — HEIGHT: 63 IN

## 2024-07-19 DIAGNOSIS — K74.02 HEPATIC FIBROSIS, ADVANCED FIBROSIS: ICD-10-CM

## 2024-07-19 PROCEDURE — 76981 USE PARENCHYMA: CPT

## 2024-12-19 ENCOUNTER — OFFICE (OUTPATIENT)
Dept: URBAN - METROPOLITAN AREA CLINIC 19 | Facility: CLINIC | Age: 64
End: 2024-12-19
Payer: MEDICARE

## 2024-12-19 VITALS — HEIGHT: 63 IN

## 2024-12-19 DIAGNOSIS — M62.838 OTHER MUSCLE SPASM: ICD-10-CM

## 2024-12-19 DIAGNOSIS — K21.9 GASTRO-ESOPHAGEAL REFLUX DISEASE WITHOUT ESOPHAGITIS: ICD-10-CM

## 2024-12-19 DIAGNOSIS — K74.3 PRIMARY BILIARY CIRRHOSIS: ICD-10-CM

## 2024-12-19 DIAGNOSIS — R55 SYNCOPE AND COLLAPSE: ICD-10-CM

## 2024-12-19 NOTE — SERVICENOTES
The duration of the telehealth visit was  minutes and  seconds., The patient's assessment was reviewed with Allan Mujica MD and a collaborative plan of care was established.

## 2024-12-19 NOTE — SERVICEHPINOTES
64-year-old obese  black female returns via telehealth for follow up of her PBC (F4). She complains of severe muscle spasms. She is concerned that this may be a side effect of Iqirvo so she has stopped taking it to see if that makes a difference in her symptoms. Her chronic GERD seems well-controlled on Prilosec 40 mg. She takes ursodiol 500 mg 2 tablets twice daily.  Her PCP recently gave her a prescription for hydroxyzine as needed for itching. She denies dysphagia, nausea, abdominal pain, change in bowel habit or any overt GI bleeding. She does endorse a lot of bruising and soreness from her recent fall. br
 She is being worked up by Gurmeet Javed MD (cardiology) for recent syncopal episode resulting in a fall and bradycardia. She is currently wearing a holter monitor that will be complete on 12/31/24. She had a routine follow up office visit with Dr. Muniz for management of her thrombocytopenia 12/18/24. Routine lab was remarkable for YYY=906 and Creatinine=1.6.[facility] 
Routine lab 10/11/24 was unremarkable except for ZAB=968 and Creatinine=1.27. EGD/colonoscopy 10/16/24 found diverticulosis coli and two small adenomatous colon polyps were removed (normal EGD).br
 We saw her 8/16/24 for follow up of her PBC (primary biliary cholangitis) and complaints of severe itching. Routine lab was remarkable for IIP=350, PLT=99, positive Mitochondrial antibody and ESR=78. Fibroscan 7/19/24 documented F4. Previous elastography 5/26/22 documented F3 and liver biopsy 9/6/22 confirmed PBC stage I. AUS 4/25/23 found mild hepatosplenomegaly consistent with primary biliary cholangitis and chronic liver disease. CT abd/pelvis 2/28/24 found a normal liver. There was a large soft tissue mass involving the (R) lower abdominal wall which has been seen on serial CT abd/pelvis since 2017. She was scheduled to see Patrick Bui MD (surgeon) on 7/1/24 to discuss surgical removal of the mass.  Routine lab 8/16/24 was remarkable for an elevated EAJ=464 IU/L (normal ) and PPC=99,000.  AMA=85.9 units (0-20).She has tried creams, Benadryl and Hydroxyzine without relief. She has chronic constipation and is taking MiraLax every other day. Her GERD seems well controlled on Prilosec 40 mg/d. She takes Ursodiol 1 g BID.  Ocaliva 5 mg/d was added last December, but stopped last June due to her progressive pruritus.  She is being evaluated for a [facility] protocol PBC study. She denies dysphagia, nausea, abdominal pain, change in bowel habit or any overt GI bleeding. She is followed by Rheumatologist, Pasquale Pandey MD for elevated alkaline phosphatase and positive INDIA 1:160 (speckled), but apparently workup for rheumatoid arthritis and other autoimmune conditions was unremarkable. She is followed by hematologist, Mark Muniz MD for her thrombocytopenia.Previous Gi studies have included EGD 9/29/20 which was normal. She has seen José Luis Griffith MD in the past. CT abdomen/pelvis 9/25/12, 10/9/12, MRI abdomen 3/28/12 and 4/2/13 and EGD 9/19/12 were unremarkable except for a 2.5 cm liver hemangioma and H pylori negative gastritis. Raad Contreras MD Performed EGD/colonoscopy 9/23/14 which found H pylori negative gastritis (colonoscopy normal).Family history is negative for colon neoplasm.

## 2025-02-28 ENCOUNTER — OFFICE (OUTPATIENT)
Dept: URBAN - METROPOLITAN AREA CLINIC 19 | Facility: CLINIC | Age: 65
End: 2025-02-28
Payer: MEDICARE

## 2025-02-28 DIAGNOSIS — K21.9 GASTRO-ESOPHAGEAL REFLUX DISEASE WITHOUT ESOPHAGITIS: ICD-10-CM

## 2025-02-28 DIAGNOSIS — K74.3 PRIMARY BILIARY CIRRHOSIS: ICD-10-CM

## 2025-02-28 PROCEDURE — 99490 CHRNC CARE MGMT STAFF 1ST 20: CPT | Performed by: INTERNAL MEDICINE

## 2025-02-28 PROCEDURE — 99439 CHRNC CARE MGMT STAF EA ADDL: CPT | Performed by: INTERNAL MEDICINE

## 2025-07-29 NOTE — SERVICENOTES
Operative Note :  Negrita Wright MD    Patient/:   Jinny Luciano / 1959    Procedure Date:   25    Pre-op Diagnosis:  Colon cancer screening [Z12.11]  Family history of colon cancer [Z80.0]    Post-Operative Diagnosis:  Colon cancer screening [Z12.11]  Family history of colon cancer [Z80.0]  Diverticulosis of splenic flexure and sigmoid colon    Procedure:   Flexible colonoscopy to the cecum and terminal ileum    Surgeon:   Negrita Wright MD    Assistant:   None    Anesthesia:    MAC (monitored anesthetic care)    Estimated Blood Loss:   Minimal    Specimens:   None    Complications:   None    Indications:  Screening colonoscopy  Family history of colon cancer in maternal uncle    Findings:  JULIO without palpable abnormalities  Anoscopy the cecum identified by the appendiceal orifice and the ileocecal valve  Terminal ileum intubated with normal-appearing distal ileal mucosa  1 wide-mouthed diverticulum within the splenic flexure of the colon noted and multiple scattered small-mouthed diverticuli throughout the sigmoid colon  Normal rectal mucosa on retroflexion  No other abnormalities    Description of procedure:  The patient was brought to the endoscopy suite and placed in the left lateral decubitus position.  Continuous propofol anesthesia was administered.  A surgical timeout was completed.  A digital rectal exam was performed, revealing no palpable abnormalities.  A pediatric colonoscope was then inserted through the anus and passed under direct visualization to the level of the cecum.  The cecum was identified via the ileocecal valve as well as the appendiceal orifice.  The scope was then slowly withdrawn, examining all circumferential walls of the ascending, transverse, descending, and sigmoid colon, as well as the rectum. There were findings as above. The scope was then withdrawn and the colon desufflated.  The colonoscopy was performed without difficulty. The patient had a very good bowel  The patient's assessment was reviewed with Dr. Mujica and a collaborative plan of care was established. prep.  The patient was transferred to the recovery area in stable condition.     Recommendations:  Repeat screening colonoscopy in 10 years.  High fiber diet.    Negrita Wright M.D.  General, Robotic, and Endoscopic Surgery  [facility]    [address] [place] [address], [address]  [place], [place], [address]  P: [phone]  F: [phone]

## 2025-08-05 ENCOUNTER — OFFICE (OUTPATIENT)
Dept: URBAN - METROPOLITAN AREA CLINIC 19 | Facility: CLINIC | Age: 65
End: 2025-08-05
Payer: MEDICARE

## 2025-08-05 VITALS
WEIGHT: 286 LBS | OXYGEN SATURATION: 98 % | SYSTOLIC BLOOD PRESSURE: 168 MMHG | DIASTOLIC BLOOD PRESSURE: 55 MMHG | HEIGHT: 63 IN | HEART RATE: 73 BPM

## 2025-08-05 DIAGNOSIS — K21.9 GASTRO-ESOPHAGEAL REFLUX DISEASE WITHOUT ESOPHAGITIS: ICD-10-CM

## 2025-08-05 DIAGNOSIS — K74.3 PRIMARY BILIARY CIRRHOSIS: ICD-10-CM

## 2025-08-05 PROCEDURE — 99214 OFFICE O/P EST MOD 30 MIN: CPT | Performed by: NURSE PRACTITIONER

## 2025-08-05 RX ORDER — OMEPRAZOLE 40 MG/1
40 CAPSULE, DELAYED RELEASE ORAL
Qty: 90 | Refills: 3 | Status: ACTIVE

## 2025-08-05 RX ORDER — URSODIOL 500 MG/1
TABLET ORAL
Qty: 240 | Refills: 5 | Status: ACTIVE